# Patient Record
Sex: MALE | Race: WHITE | Employment: OTHER | URBAN - NONMETROPOLITAN AREA
[De-identification: names, ages, dates, MRNs, and addresses within clinical notes are randomized per-mention and may not be internally consistent; named-entity substitution may affect disease eponyms.]

---

## 2022-02-23 ENCOUNTER — APPOINTMENT (OUTPATIENT)
Dept: GENERAL RADIOLOGY | Age: 52
End: 2022-02-23
Payer: COMMERCIAL

## 2022-02-23 ENCOUNTER — HOSPITAL ENCOUNTER (EMERGENCY)
Age: 52
Discharge: HOME | End: 2022-02-23
Attending: EMERGENCY MEDICINE
Payer: COMMERCIAL

## 2022-02-23 ENCOUNTER — APPOINTMENT (OUTPATIENT)
Dept: CT IMAGING | Age: 52
End: 2022-02-23
Payer: COMMERCIAL

## 2022-02-23 VITALS
HEART RATE: 80 BPM | WEIGHT: 150 LBS | SYSTOLIC BLOOD PRESSURE: 123 MMHG | TEMPERATURE: 98.3 F | DIASTOLIC BLOOD PRESSURE: 79 MMHG | OXYGEN SATURATION: 100 % | RESPIRATION RATE: 19 BRPM

## 2022-02-23 DIAGNOSIS — F10.920 ACUTE ALCOHOLIC INTOXICATION WITHOUT COMPLICATION (HCC): ICD-10-CM

## 2022-02-23 DIAGNOSIS — R93.89 ABNORMAL CT SCAN: ICD-10-CM

## 2022-02-23 DIAGNOSIS — R10.9 ABDOMINAL PAIN, UNSPECIFIED ABDOMINAL LOCATION: ICD-10-CM

## 2022-02-23 DIAGNOSIS — R07.9 CHEST PAIN, UNSPECIFIED TYPE: Primary | ICD-10-CM

## 2022-02-23 DIAGNOSIS — M54.50 CHRONIC BILATERAL LOW BACK PAIN WITHOUT SCIATICA: ICD-10-CM

## 2022-02-23 DIAGNOSIS — G89.29 CHRONIC BILATERAL LOW BACK PAIN WITHOUT SCIATICA: ICD-10-CM

## 2022-02-23 DIAGNOSIS — R11.0 NAUSEA: ICD-10-CM

## 2022-02-23 LAB
ALBUMIN SERPL-MCNC: 4.6 G/DL (ref 3.5–5.1)
ALP BLD-CCNC: 68 U/L (ref 38–126)
ALT SERPL-CCNC: 40 U/L (ref 11–66)
ANION GAP SERPL CALCULATED.3IONS-SCNC: 16 MEQ/L (ref 8–16)
AST SERPL-CCNC: 61 U/L (ref 5–40)
BASOPHILS # BLD: 1 %
BASOPHILS ABSOLUTE: 0.1 THOU/MM3 (ref 0–0.1)
BILIRUB SERPL-MCNC: 0.3 MG/DL (ref 0.3–1.2)
BUN BLDV-MCNC: 7 MG/DL (ref 7–22)
CALCIUM SERPL-MCNC: 8.9 MG/DL (ref 8.5–10.5)
CHLORIDE BLD-SCNC: 96 MEQ/L (ref 98–111)
CO2: 20 MEQ/L (ref 23–33)
CREAT SERPL-MCNC: 0.6 MG/DL (ref 0.4–1.2)
D-DIMER QUANTITATIVE: 721 NG/ML FEU (ref 0–500)
EOSINOPHIL # BLD: 4.4 %
EOSINOPHILS ABSOLUTE: 0.6 THOU/MM3 (ref 0–0.4)
ERYTHROCYTE [DISTWIDTH] IN BLOOD BY AUTOMATED COUNT: 12.7 % (ref 11.5–14.5)
ERYTHROCYTE [DISTWIDTH] IN BLOOD BY AUTOMATED COUNT: 44.3 FL (ref 35–45)
ETHYL ALCOHOL, SERUM: 0.19 %
GFR SERPL CREATININE-BSD FRML MDRD: > 90 ML/MIN/1.73M2
GLUCOSE BLD-MCNC: 77 MG/DL (ref 70–108)
HCT VFR BLD CALC: 38.1 % (ref 42–52)
HEMOGLOBIN: 13.1 GM/DL (ref 14–18)
IMMATURE GRANS (ABS): 0.05 THOU/MM3 (ref 0–0.07)
IMMATURE GRANULOCYTES: 0.4 %
LIPASE: 42.8 U/L (ref 5.6–51.3)
LYMPHOCYTES # BLD: 32.7 %
LYMPHOCYTES ABSOLUTE: 4.6 THOU/MM3 (ref 1–4.8)
MCH RBC QN AUTO: 32.8 PG (ref 26–33)
MCHC RBC AUTO-ENTMCNC: 34.4 GM/DL (ref 32.2–35.5)
MCV RBC AUTO: 95.3 FL (ref 80–94)
MONOCYTES # BLD: 7.2 %
MONOCYTES ABSOLUTE: 1 THOU/MM3 (ref 0.4–1.3)
NUCLEATED RED BLOOD CELLS: 0 /100 WBC
OSMOLALITY CALCULATION: 261.3 MOSMOL/KG (ref 275–300)
PLATELET # BLD: 255 THOU/MM3 (ref 130–400)
PMV BLD AUTO: 8.7 FL (ref 9.4–12.4)
POTASSIUM REFLEX MAGNESIUM: 3.7 MEQ/L (ref 3.5–5.2)
RBC # BLD: 4 MILL/MM3 (ref 4.7–6.1)
SEG NEUTROPHILS: 54.3 %
SEGMENTED NEUTROPHILS ABSOLUTE COUNT: 7.6 THOU/MM3 (ref 1.8–7.7)
SODIUM BLD-SCNC: 132 MEQ/L (ref 135–145)
TOTAL PROTEIN: 7.9 G/DL (ref 6.1–8)
TROPONIN T: < 0.01 NG/ML
TROPONIN T: < 0.01 NG/ML
WBC # BLD: 14 THOU/MM3 (ref 4.8–10.8)

## 2022-02-23 PROCEDURE — 6360000002 HC RX W HCPCS: Performed by: EMERGENCY MEDICINE

## 2022-02-23 PROCEDURE — 74177 CT ABD & PELVIS W/CONTRAST: CPT

## 2022-02-23 PROCEDURE — 84484 ASSAY OF TROPONIN QUANT: CPT

## 2022-02-23 PROCEDURE — 85379 FIBRIN DEGRADATION QUANT: CPT

## 2022-02-23 PROCEDURE — 6360000004 HC RX CONTRAST MEDICATION: Performed by: EMERGENCY MEDICINE

## 2022-02-23 PROCEDURE — 80053 COMPREHEN METABOLIC PANEL: CPT

## 2022-02-23 PROCEDURE — 96375 TX/PRO/DX INJ NEW DRUG ADDON: CPT

## 2022-02-23 PROCEDURE — 83690 ASSAY OF LIPASE: CPT

## 2022-02-23 PROCEDURE — 82077 ASSAY SPEC XCP UR&BREATH IA: CPT

## 2022-02-23 PROCEDURE — 99284 EMERGENCY DEPT VISIT MOD MDM: CPT

## 2022-02-23 PROCEDURE — 93005 ELECTROCARDIOGRAM TRACING: CPT | Performed by: EMERGENCY MEDICINE

## 2022-02-23 PROCEDURE — 71045 X-RAY EXAM CHEST 1 VIEW: CPT

## 2022-02-23 PROCEDURE — 36415 COLL VENOUS BLD VENIPUNCTURE: CPT

## 2022-02-23 PROCEDURE — 2580000003 HC RX 258: Performed by: EMERGENCY MEDICINE

## 2022-02-23 PROCEDURE — 85025 COMPLETE CBC W/AUTO DIFF WBC: CPT

## 2022-02-23 PROCEDURE — 71275 CT ANGIOGRAPHY CHEST: CPT

## 2022-02-23 PROCEDURE — 96374 THER/PROPH/DIAG INJ IV PUSH: CPT

## 2022-02-23 RX ORDER — LIDOCAINE 50 MG/G
1 PATCH TOPICAL DAILY
Qty: 3 PATCH | Refills: 0 | Status: SHIPPED | OUTPATIENT
Start: 2022-02-23 | End: 2022-02-26

## 2022-02-23 RX ORDER — ONDANSETRON 2 MG/ML
4 INJECTION INTRAMUSCULAR; INTRAVENOUS ONCE
Status: COMPLETED | OUTPATIENT
Start: 2022-02-23 | End: 2022-02-23

## 2022-02-23 RX ORDER — FENTANYL CITRATE 50 UG/ML
25 INJECTION, SOLUTION INTRAMUSCULAR; INTRAVENOUS ONCE
Status: COMPLETED | OUTPATIENT
Start: 2022-02-23 | End: 2022-02-23

## 2022-02-23 RX ORDER — SODIUM CHLORIDE 9 MG/ML
1000 INJECTION, SOLUTION INTRAVENOUS CONTINUOUS
Status: DISCONTINUED | OUTPATIENT
Start: 2022-02-23 | End: 2022-02-23

## 2022-02-23 RX ADMIN — SODIUM CHLORIDE 1000 ML: 9 INJECTION, SOLUTION INTRAVENOUS at 17:16

## 2022-02-23 RX ADMIN — FENTANYL CITRATE 25 MCG: 50 INJECTION INTRAMUSCULAR; INTRAVENOUS at 19:09

## 2022-02-23 RX ADMIN — IOPAMIDOL 80 ML: 755 INJECTION, SOLUTION INTRAVENOUS at 18:33

## 2022-02-23 RX ADMIN — ONDANSETRON 4 MG: 2 INJECTION INTRAMUSCULAR; INTRAVENOUS at 18:01

## 2022-02-23 ASSESSMENT — ENCOUNTER SYMPTOMS
NAUSEA: 1
BACK PAIN: 1
ABDOMINAL PAIN: 1

## 2022-02-23 ASSESSMENT — PAIN SCALES - GENERAL
PAINLEVEL_OUTOF10: 10
PAINLEVEL_OUTOF10: 3

## 2022-02-23 NOTE — ED PROVIDER NOTES
[facility] [address] [address] EMERGENCY DEPT  eMERGENCY dEPARTMENT eNCOUnter      Pt Name: Rogelio Osorio  MRN: [id number]  [place] 1970  Date of evaluation: 2/23/22      CHIEF COMPLAINT       Back pain      HISTORY OF PRESENT ILLNESS     Rogelio Osorio is a 49 y.o. male who presents to the emergency department for evaluation of multiple concerns. Patient describes that since yesterday morning he has had chest pain, abdominal pain, back pain, and nausea. Patient denies any fall or injury. Patient does describe that he is from [place]. He is here in this area because his niece is expecting a child and came to help. He states that yesterday he was helping \"get a nursery together\". Denies any fall or direct impact injury. No heavy lifting but does describe repetitive movements. Does have a history of chronic lower back pain. No lower extremity radiculopathy. No saddle anesthesias. No incontinence or retention of stool/urine. Denies any fever or shaking chills. No HEENT complaints. No palpitations. No near syncope. No shortness of breath, cough, or wheezing. No vomiting. No genitourinary or stool complaints. Patient does admit to drinking alcohol today to try to self medicate for pain. Does have a history of seizures. No seizure activity today. Review of systems otherwise negative. He has no additional complaints at this time. REVIEW OF SYSTEMS       Review of Systems   Cardiovascular: Positive for chest pain. Gastrointestinal: Positive for abdominal pain and nausea. Musculoskeletal: Positive for back pain. All other systems reviewed and are negative. See HPI      PAST MEDICAL HISTORY     Seizures      SURGICAL HISTORY       Denies      CURRENT MEDICATIONS       See nursing notes    ALLERGIES       is allergic to bactrim [sulfamethoxazole-trimethoprim], dilantin [phenytoin], and nsaids.       FAMILY HISTORY       Non-contributory      SOCIAL HISTORY       Tobacco use  Social alcohol      PHYSICAL EXAM INITIAL VITALS:  weight is 150 lb (68 kg). His oral temperature is 98 °F (36.7 °C). His blood pressure is 107/64 and his pulse is 85. His respiration is 19 and oxygen saturation is 100%. Physical Exam  Vitals and nursing note reviewed. Constitutional:       General: He is not in acute distress. Comments: Sleeping on arrival to room. No distress. Cardiovascular:      Rate and Rhythm: Normal rate and regular rhythm. Heart sounds: Normal heart sounds. No murmur heard. Pulmonary:      Effort: Pulmonary effort is normal.      Breath sounds: Normal breath sounds. Comments: Even minimal touching of the skin overlying the left lower ribs reproduces discomfort  Abdominal:      General: Bowel sounds are normal. There is distension. Palpations: Abdomen is soft. Tenderness: There is no abdominal tenderness. There is no rebound. Comments: Even minimal touching of the skin in the epigastric and left upper quadrant reproduces discomfort   Musculoskeletal:      Comments: Even minimal touching of the skin over his whole back reproduces discomfort. No focal midline pain. No step-off or deformity   Skin:     General: Skin is warm and dry. Capillary Refill: Capillary refill takes less than 2 seconds. Findings: No rash. Comments: Patient has no rash, skin change, or evidence for trauma in the areas of his reported intense discomfort   Neurological:      General: No focal deficit present. Sensory: No sensory deficit. Motor: No weakness. Psychiatric:         Mood and Affect: Mood normal.         Behavior: Behavior normal.       DIAGNOSTIC RESULTS     EKG:  All EKG's are interpreted by the Emergency Department Physician who either signs or Co-signs this chart in the absence of a cardiologist.    EKG at 1705 shows a normal sinus rhythm with a rate of 85 bpm.  Prolonged QTc of 483 ms. Normal axis. R wave progression is maintained. T wave inversions in aVL.   No consistent ST elevation or acute infarction. No arrhythmia. RADIOLOGY:   I directly visualized the following images and reviewed the radiologist interpretations:    CT ABDOMEN PELVIS W IV CONTRAST Additional Contrast? None   Final Result   Impression:   No diverticulitis or bowel obstruction. Normal appendix. Small hiatal hernia. In the left lower lung there is a small ill-defined density measuring 7 mm    likely incidental but technically indeterminate. Recommend comparison to    previous or follow-up in 3-6 months. This document has been electronically signed by: Finesse Adams MD on    02/23/2022 06:54 PM      All CTs at this facility use dose modulation techniques and iterative    reconstructions, and/or weight-based dosing   when appropriate to reduce radiation to a low as reasonably achievable. CTA CHEST W WO CONTRAST - r/o Pulmonary Embolism   Final Result   Impression:   No PE is identified. Nonspecific bronchial wall thickening bilaterally with mucus plugging in    the right lower lobe. This document has been electronically signed by: Finesse Adams MD on    02/23/2022 06:49 PM      All CTs at this facility use dose modulation techniques and iterative    reconstructions, and/or weight-based dosing   when appropriate to reduce radiation to a low as reasonably achievable. XR CHEST PORTABLE   Final Result   Impression:   No acute abnormalities.       This document has been electronically signed by: Finesse Adams MD on    02/23/2022 05:29 PM          LABS:  Labs Reviewed   CBC WITH AUTO DIFFERENTIAL - Abnormal; Notable for the following components:       Result Value    WBC 14.0 (*)     RBC 4.00 (*)     Hemoglobin 13.1 (*)     Hematocrit 38.1 (*)     MCV 95.3 (*)     MPV 8.7 (*)     Eosinophils Absolute 0.6 (*)     All other components within normal limits   COMPREHENSIVE METABOLIC PANEL W/ REFLEX TO MG FOR LOW K - Abnormal; Notable for the following components:    Sodium 132 (*) Chloride 96 (*)     CO2 20 (*)     AST 61 (*)     All other components within normal limits   D-DIMER, QUANTITATIVE - Abnormal; Notable for the following components:    D-Dimer, Quant 721.00 (*)     All other components within normal limits   OSMOLALITY - Abnormal; Notable for the following components:    Osmolality Calc 261.3 (*)     All other components within normal limits   LIPASE   TROPONIN   ETHANOL   ANION GAP   GLOMERULAR FILTRATION RATE, ESTIMATED   TROPONIN       EMERGENCY DEPARTMENT COURSE:     Vitals:    Vitals:    02/23/22 1536 02/23/22 1715 02/23/22 1845   BP: 109/78 99/65 107/64   Pulse: 89 88 85   Resp: 17 18 19   Temp: 98.3 °F (36.8 °C)  98 °F (36.7 °C)   TempSrc: Oral  Oral   SpO2: 98% 100% 100%   Weight: 150 lb (68 kg)       -------------------------  BP: 107/64, Temp: 98 °F (36.7 °C), Pulse: 85, Resp: 19      Zofran and IV fluids ordered awaiting labs and imaging      MEDICAL DECISION MAKING:     Patient is in no distress  He reports a severe, intense pain even with minimal touching of his skin  Patient's blood pressure is in the 90s so we will hold on opiates for analgesia    Chest x-ray    Labs reviewed  Elevated D-dimer  CTs added    No clinical suspicion for acute spinal cord compression/cauda equina  Emergent MRI scans not indicated    CT scans reviewed  No PE  No acute surgical process in the abdomen    Patient updated  No new symptoms  We will order a repeat troponin  BP improved  Low dose fentanyl ordered given NSAID allergy  Patient agreeable with plan      FINAL IMPRESSION      1. Chest pain, unspecified type    2. Abdominal pain, unspecified abdominal location    3. Nausea    4.  Chronic bilateral low back pain without sciatica          DISPOSITION/PLAN     Possible discharge if trop/repeat EKG negative      (Please note that portions of this note were completed with a voice recognition program.  Efforts were made to edit the dictations but occasionally words are mis-transcribed.)    Margarito Snider.  DO [facility]  Attending Emergency Physician       Elizabeth Echevarria DO  02/23/22 1907

## 2022-02-23 NOTE — ED NOTES
Patient to the ED with complaints of back pain for past few days. Patient states pain radiates from low back to upper back. Patient states he has taken tylenol and ibuprofen as well as \"a few tall boys\" without successful relief. He denies any injuries. Patient is resting in bed with easy and unlabored respirations. Call light in reach. Side rails up x2. Patient denies further complaints or concerns. Will monitor.         Chela Bermudez RN  02/23/22 1295

## 2022-02-23 NOTE — ED NOTES
Bedside report taken from Silvia Serrano  This nurse assuming care will continue to monitor   No new complaints at this time  Patient resting in bed. Respirations easy and unlabored. No distress noted. Call light within reach.   Pt medicated per Baldo Low RN  02/23/22 1973

## 2022-02-23 NOTE — ED NOTES
Bed: 016A  Expected date: 2/23/22  Expected time: 3:36 PM  Means of arrival: Ambulance  Comments:  Vinny Mathis RN  02/23/22 5601

## 2022-02-23 NOTE — ED NOTES
Patient resting in bed. Respirations easy and unlabored. No distress noted. Call light within reach.   No new complaints at this time  Returned from [address]  02/23/22 0764

## 2022-02-24 LAB
EKG ATRIAL RATE: 83 BPM
EKG ATRIAL RATE: 85 BPM
EKG P AXIS: 51 DEGREES
EKG P AXIS: 64 DEGREES
EKG P-R INTERVAL: 126 MS
EKG P-R INTERVAL: 128 MS
EKG Q-T INTERVAL: 406 MS
EKG Q-T INTERVAL: 420 MS
EKG QRS DURATION: 88 MS
EKG QRS DURATION: 90 MS
EKG QTC CALCULATION (BAZETT): 483 MS
EKG QTC CALCULATION (BAZETT): 493 MS
EKG R AXIS: 67 DEGREES
EKG R AXIS: 73 DEGREES
EKG T AXIS: 65 DEGREES
EKG T AXIS: 75 DEGREES
EKG VENTRICULAR RATE: 83 BPM
EKG VENTRICULAR RATE: 85 BPM

## 2022-02-24 PROCEDURE — 93010 ELECTROCARDIOGRAM REPORT: CPT | Performed by: INTERNAL MEDICINE

## 2022-02-24 NOTE — ED PROVIDER NOTES
Signout received from Dr. Michelle Noonan    Pt presents to the ER complaining of L mid back pain. CTA chest showing some bronchial wall thickening and mucus plugging, however patient denies any respiratory symptoms. Etoh level elevated, reeval at 1015p, patient awake and alert, speech clear and fluent, ambulatory with steady gait, no longer clinically intoxicated. He denies any chest pain at any point to me. He denies any midline back pain, no midline tenderness on exam.  No weakness/numbness. Pt requesting dc home. He has normal mental status, hemodynamically stable, well appearing, nontoxic. I did discuss with patient ct scan findings, including mucus plugging, pulmonary nodule, need for further testing. He agrees to review all test results with his primary care doctor. Pt also counseled extensively regarding importance of close outpatient f/u and Er return precautions. Diagnosis: Thoracic back pain; acute alcohol intoxication;  Abnormal CT scan    Dispo: Discharge home     Clarissa Krishna MD  02/23/22 5447

## 2022-02-24 NOTE — ED NOTES
Patient resting in bed. Respirations easy and unlabored. No distress noted. Call light within reach.   Given lunch box and water     Jumana Nj RN  02/23/22 4784

## 2022-02-24 NOTE — ED NOTES
Patient resting in bed. Respirations easy and unlabored. No distress noted. Call light within reach.        Jannet Osei RN  02/23/22 2018